# Patient Record
Sex: MALE | Race: WHITE | NOT HISPANIC OR LATINO | Employment: FULL TIME | URBAN - METROPOLITAN AREA
[De-identification: names, ages, dates, MRNs, and addresses within clinical notes are randomized per-mention and may not be internally consistent; named-entity substitution may affect disease eponyms.]

---
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
## 2020-06-30 ENCOUNTER — OFFICE VISIT (OUTPATIENT)
Dept: CARDIOLOGY | Facility: CLINIC | Age: 20
End: 2020-06-30

## 2020-06-30 VITALS
DIASTOLIC BLOOD PRESSURE: 78 MMHG | HEIGHT: 72 IN | WEIGHT: 165 LBS | SYSTOLIC BLOOD PRESSURE: 132 MMHG | HEART RATE: 64 BPM | BODY MASS INDEX: 22.35 KG/M2

## 2020-06-30 DIAGNOSIS — R01.1 MURMUR, CARDIAC: Primary | ICD-10-CM

## 2020-06-30 PROCEDURE — 93000 ELECTROCARDIOGRAM COMPLETE: CPT | Performed by: INTERNAL MEDICINE

## 2020-06-30 PROCEDURE — 99204 OFFICE O/P NEW MOD 45 MIN: CPT | Performed by: INTERNAL MEDICINE

## 2020-06-30 NOTE — PROGRESS NOTES
Date of Office Visit: 20  Encounter Provider: Margarito Cloud MD  Place of Service: [facility] CARDIOLOGY  Patient Name: Marcel Almodovar  :2000  [id number]    Chief Complaint   Patient presents with   • History of Heart Valve Disease   • Heart Murmur   :     HPI: Marcel Almodovar is a 20 y.o. male he is here as a new patient to follow-up a history of a heart murmur he had last seen a pediatric cardiologist 10 years ago and was told that he should see a heart doctor about every 5 years.  He plays full court basketball for up to an hour at a time without limitation he has no symptoms of chest pain shortness of breath palpitations syncope PND orthopnea edema him he is not limited in any way in his ability to exercise.  His mother who is a nurse and I know well says that it sounded like maybe he had a floppy valve and the murmur was from the chordae tendon a so it sounds like may be a mitral valve issue.  He otherwise does not have any other past medical history    History reviewed. No pertinent past medical history.    Past Surgical History:   Procedure Laterality Date   • TONSILLECTOMY     • WISDOM TOOTH EXTRACTION         Social History     Socioeconomic History   • Marital status: Single     Spouse name: Not on file   • Number of children: Not on file   • Years of education: Not on file   • Highest education level: Not on file   Tobacco Use   • Smoking status: Never Smoker   • Smokeless tobacco: Never Used   Substance and Sexual Activity   • Alcohol use: No   • Drug use: No       Family History   Problem Relation Age of Onset   • No Known Problems Mother    • No Known Problems Father    • Heart disease Maternal Grandmother    • Coronary artery disease Maternal Grandfather 60        CABG       Review of Systems   Constitution: Negative for decreased appetite, fever, malaise/fatigue and weight loss.   HENT: Negative for nosebleeds.    Eyes: Negative for double vision.  "  Cardiovascular: Negative for chest pain, claudication, cyanosis, dyspnea on exertion, irregular heartbeat, leg swelling, near-syncope, orthopnea, palpitations, paroxysmal nocturnal dyspnea and syncope.   Respiratory: Negative for cough, hemoptysis and shortness of breath.    Hematologic/Lymphatic: Negative for bleeding problem.   Skin: Negative for rash.   Musculoskeletal: Negative for falls and myalgias.   Gastrointestinal: Negative for hematochezia, jaundice, melena, nausea and vomiting.   Genitourinary: Negative for hematuria.   Neurological: Negative for dizziness and seizures.   Psychiatric/Behavioral: Negative for altered mental status and memory loss.       No Known Allergies    No current outpatient medications on file.      Objective:     Vitals:    06/30/20 1539   BP: 132/78   Pulse: 64   Weight: 74.8 kg (165 lb)   Height: 182.9 cm (72\")     Body mass index is 22.38 kg/m².    Physical Exam   Constitutional: He is oriented to person, place, and time. He appears well-developed and well-nourished.   HENT:   Head: Normocephalic.   Eyes: No scleral icterus.   Neck: No JVD present. No thyromegaly present.   Cardiovascular: Normal rate, regular rhythm and normal heart sounds. Exam reveals no gallop and no friction rub.   No murmur heard.  Pulmonary/Chest: Effort normal and breath sounds normal. He has no wheezes. He has no rales.   Abdominal: Soft. There is no hepatosplenomegaly. There is no tenderness.   Musculoskeletal: Normal range of motion. He exhibits no edema.   Lymphadenopathy:     He has no cervical adenopathy.   Neurological: He is alert and oriented to person, place, and time.   Skin: Skin is warm and dry. No rash noted.   Psychiatric: He has a normal mood and affect.         ECG 12 Lead  Date/Time: 6/30/2020 4:37 PM  Performed by: Margarito Cloud MD  Authorized by: Margarito Cloud MD   Comparison: not compared with previous ECG   Previous ECG: no previous ECG available  Rhythm: sinus " rhythm    Clinical impression: normal ECG             Assessment:       Diagnosis Plan   1. Murmur, cardiac            Plan:       On my exam today he is normal and his EKG is also normal I do not know if he had something and he outgrew it but I am not hearing much on his exam today what I am going to do is I am going asked that we get an echo because he had seen a cardiologist who felt like he needed to be seen periodically and if that looks good then I think we will just let him go and go from there if we see something that we need to do something about then we will have to continue to follow him to give him some general lifestyle modification recommendations and he is a really great kid    As always, it has been a pleasure to participate in your patient's care.      Sincerely,       Margarito Cloud MD

## 2021-02-16 ENCOUNTER — OFFICE VISIT (OUTPATIENT)
Dept: INTERNAL MEDICINE | Facility: CLINIC | Age: 21
End: 2021-02-16

## 2021-02-16 VITALS
TEMPERATURE: 97.3 F | WEIGHT: 168.2 LBS | OXYGEN SATURATION: 99 % | HEIGHT: 72 IN | DIASTOLIC BLOOD PRESSURE: 78 MMHG | BODY MASS INDEX: 22.78 KG/M2 | RESPIRATION RATE: 16 BRPM | SYSTOLIC BLOOD PRESSURE: 120 MMHG | HEART RATE: 100 BPM

## 2021-02-16 DIAGNOSIS — R59.0 POSTERIOR CERVICAL LYMPHADENOPATHY: ICD-10-CM

## 2021-02-16 DIAGNOSIS — R53.83 FATIGUE, UNSPECIFIED TYPE: ICD-10-CM

## 2021-02-16 DIAGNOSIS — J02.9 ACUTE PHARYNGITIS, UNSPECIFIED ETIOLOGY: Primary | ICD-10-CM

## 2021-02-16 PROCEDURE — 99203 OFFICE O/P NEW LOW 30 MIN: CPT | Performed by: NURSE PRACTITIONER

## 2021-02-16 NOTE — PROGRESS NOTES
Subjective    Marcel Almodovar is a 21 y.o. male presenting today for   Chief Complaint   Patient presents with   • Sore Throat     had covid in January- no fevers    • Fatigue   • Headache       Sore Throat   This is a new problem. Episode onset: 10 days ago. The problem has been unchanged. Neither side of throat is experiencing more pain than the other. Maximum temperature: Fever (uncertain to what degree) at onset of S&S but none in a week. Associated symptoms include congestion, coughing (productive) and headaches. Pertinent negatives include no diarrhea, shortness of breath or vomiting. Exposure to: No known ill contacts. He has tried acetaminophen (for HA) for the symptoms. The treatment provided significant relief.     Pt tested positive for COVID-19 on Jan 19th. Mild S&S. No returned to work on 1/28. His S&S resolved in that time w/ the exception of loss of taste and smell.     Marcel Almodovar presents today as a new patient to me to establish care.   Prior PCP was Dr. Pérez.  Patient Care Team:  Francy Montes De Oca APRN as PCP - General (Family Medicine)    Current/chronic health conditions include:    Patient Active Problem List   Diagnosis   • Murmur, cardiac       No current outpatient medications on file.        New complaints today include:  See above      The following portions of the patient's history were reviewed and updated as appropriate: allergies, current medications, problem list, past medical history, past surgical history, family history, and social history.     Review of Systems   Constitutional: Positive for fatigue and fever.   HENT: Positive for congestion, rhinorrhea and sore throat.    Respiratory: Positive for cough (productive). Negative for shortness of breath and wheezing.    Gastrointestinal: Negative for diarrhea, nausea and vomiting.   Neurological: Positive for headache.       Objective    Vitals:    02/16/21 1450   BP: 120/78   BP Location: Left arm   Patient Position:  "Sitting   Cuff Size: Adult   Pulse: 100   Resp: 16   Temp: 97.3 °F (36.3 °C)   TempSrc: Temporal   SpO2: 99%   Weight: 76.3 kg (168 lb 3.2 oz)   Height: 182.9 cm (72.01\")     Body mass index is 22.81 kg/m².  Nursing notes and vitals reviewed.    Physical Exam  Constitutional:       General: He is not in acute distress.     Appearance: He is well-developed.   HENT:      Right Ear: Tympanic membrane, ear canal and external ear normal.      Left Ear: Tympanic membrane, ear canal and external ear normal.      Nose: Nose normal.      Mouth/Throat:      Mouth: Mucous membranes are moist.      Pharynx: Oropharynx is clear. Uvula midline.   Eyes:      Conjunctiva/sclera: Conjunctivae normal.   Neck:      Musculoskeletal: Neck supple.      Thyroid: No thyroid mass or thyromegaly.   Cardiovascular:      Rate and Rhythm: Regular rhythm.      Pulses: Normal pulses.      Heart sounds: S1 normal and S2 normal. No murmur. No friction rub. No gallop.    Pulmonary:      Effort: Pulmonary effort is normal.      Breath sounds: Normal breath sounds. No wheezing, rhonchi or rales.   Abdominal:      General: Abdomen is flat.      Palpations: Abdomen is soft. There is no hepatomegaly or splenomegaly.      Tenderness: There is no abdominal tenderness.   Lymphadenopathy:      Cervical: Cervical adenopathy present.      Right cervical: Posterior cervical adenopathy present.      Left cervical: Posterior cervical adenopathy present.   Neurological:      Mental Status: He is alert and oriented to person, place, and time.   Psychiatric:         Attention and Perception: He is attentive.         Speech: Speech normal.         Behavior: Behavior normal.         Thought Content: Thought content normal.         No results found for this or any previous visit (from the past 672 hour(s)).      Assessment and Plan    Diagnoses and all orders for this visit:    1. Acute pharyngitis, unspecified etiology (Primary)  -     Throat / Upper Respiratory " Culture - Swab, Throat  -     CBC (No Diff)  -     Comprehensive Metabolic Panel  -     Mononucleosis Screen  -     EBV Antibody Profile    2. Fatigue, unspecified type  -     Throat / Upper Respiratory Culture - Swab, Throat  -     CBC (No Diff)  -     Comprehensive Metabolic Panel  -     Mononucleosis Screen  -     EBV Antibody Profile    3. Posterior cervical lymphadenopathy  -     Throat / Upper Respiratory Culture - Swab, Throat  -     CBC (No Diff)  -     Comprehensive Metabolic Panel  -     Mononucleosis Screen  -     EBV Antibody Profile      Rest.  Push fluids.  Cepacol OTC for ST.   Tylenol for HA.  Avoid contact sports. No kissing, sharing of food/beverages. Good hand washing.        Medications, including side effects, were discussed with the patient. Patient verbalized understanding.  The plan of care was discussed. All questions were answered. Patient verbalized understanding.        Return if symptoms worsen or fail to improve.

## 2021-02-19 LAB
ALBUMIN SERPL-MCNC: 4.7 G/DL (ref 3.5–5.2)
ALBUMIN/GLOB SERPL: 2 G/DL
ALP SERPL-CCNC: 91 U/L (ref 39–117)
ALT SERPL-CCNC: 15 U/L (ref 1–41)
AST SERPL-CCNC: 20 U/L (ref 1–40)
BACTERIA SPEC RESP CULT: NORMAL
BACTERIA SPEC RESP CULT: NORMAL
BILIRUB SERPL-MCNC: 0.3 MG/DL (ref 0–1.2)
BUN SERPL-MCNC: 10 MG/DL (ref 6–20)
BUN/CREAT SERPL: 10.3 (ref 7–25)
CALCIUM SERPL-MCNC: 9.8 MG/DL (ref 8.6–10.5)
CHLORIDE SERPL-SCNC: 101 MMOL/L (ref 98–107)
CO2 SERPL-SCNC: 28.1 MMOL/L (ref 22–29)
CREAT SERPL-MCNC: 0.97 MG/DL (ref 0.76–1.27)
EBV NA IGG SER IA-ACNC: 527 U/ML (ref 0–17.9)
EBV VCA IGG SER IA-ACNC: >600 U/ML (ref 0–17.9)
EBV VCA IGM SER IA-ACNC: <36 U/ML (ref 0–35.9)
ERYTHROCYTE [DISTWIDTH] IN BLOOD BY AUTOMATED COUNT: 12.5 % (ref 12.3–15.4)
GLOBULIN SER CALC-MCNC: 2.4 GM/DL
GLUCOSE SERPL-MCNC: 79 MG/DL (ref 65–99)
HCT VFR BLD AUTO: 46.9 % (ref 37.5–51)
HETEROPH AB SER QL LA: NEGATIVE
HGB BLD-MCNC: 15.7 G/DL (ref 13–17.7)
MCH RBC QN AUTO: 28.5 PG (ref 26.6–33)
MCHC RBC AUTO-ENTMCNC: 33.5 G/DL (ref 31.5–35.7)
MCV RBC AUTO: 85.3 FL (ref 79–97)
PLATELET # BLD AUTO: 216 10*3/MM3 (ref 140–450)
POTASSIUM SERPL-SCNC: 4.3 MMOL/L (ref 3.5–5.2)
PROT SERPL-MCNC: 7.1 G/DL (ref 6–8.5)
RBC # BLD AUTO: 5.5 10*6/MM3 (ref 4.14–5.8)
SERVICE CMNT-IMP: ABNORMAL
SODIUM SERPL-SCNC: 141 MMOL/L (ref 136–145)
WBC # BLD AUTO: 7.53 10*3/MM3 (ref 3.4–10.8)

## 2024-02-29 ENCOUNTER — OFFICE VISIT (OUTPATIENT)
Dept: ORTHOPEDIC SURGERY | Facility: CLINIC | Age: 24
End: 2024-02-29
Payer: COMMERCIAL

## 2024-02-29 VITALS — WEIGHT: 180.3 LBS | TEMPERATURE: 98.6 F | HEIGHT: 72 IN | BODY MASS INDEX: 24.42 KG/M2

## 2024-02-29 DIAGNOSIS — M77.12 LATERAL EPICONDYLITIS OF LEFT ELBOW: ICD-10-CM

## 2024-02-29 DIAGNOSIS — M79.632 LEFT FOREARM PAIN: Primary | ICD-10-CM

## 2024-02-29 PROCEDURE — 99203 OFFICE O/P NEW LOW 30 MIN: CPT | Performed by: ORTHOPAEDIC SURGERY

## 2024-02-29 RX ORDER — MELOXICAM 15 MG/1
TABLET ORAL
Qty: 30 TABLET | Refills: 0 | Status: SHIPPED | OUTPATIENT
Start: 2024-02-29

## 2024-02-29 NOTE — PROGRESS NOTES
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        Allergies: No Known Allergies    Medications:   Home Medications:  No current outpatient medications on file prior to visit.     No current facility-administered medications on file prior to visit.     Current Medications:  Scheduled Meds:  Continuous Infusions:No current facility-administered medications for this visit.    PRN Meds:.    History reviewed. No pertinent past medical history.     Past Surgical History:   Procedure Laterality Date    TONSILLECTOMY  2017        Social History     Occupational History    Not on file   Tobacco Use    Smoking status: Never    Smokeless tobacco: Never    Tobacco comments:     Pt vapes   Vaping Use    Vaping Use: Every day    Substances: Nicotine    Devices: Refillable tank   Substance and Sexual Activity    Alcohol use: No    Drug use: Yes     Types: Marijuana     Comment: currently smokes maijuana daily     Sexual activity: Not on file      Social History     Social History Narrative    Not on file        Family History   Problem Relation Age of Onset    No Known Problems Mother     Prostate cancer Father     Heart disease Maternal Grandmother     Coronary artery disease Maternal Grandfather 60        CABG             Review of Systems:   Review of Systems      Physical Exam: 24 y.o. male  General Appearance:    Alert, cooperative, in no acute distress                   Vitals:    02/29/24 1120   Temp: 98.6 °F (37 °C)   TempSrc: Temporal   Weight: 81.8 kg (180 lb 4.8 oz)   Height: 182.9 cm (72\")   PainSc:   6      Patient is " alert and read ×3 no acute distress appears her above-listed at height weight and age.  Affect is normal respiratory rate is normal unlabored. Heart rate regular rate rhythm, sclera, dentition and hearing are normal for the purpose of this exam.        Ortho Exam        Physical exam of the left elbow reveals no effusion no redness.  The patient has full range of motion.  They have tenderness over the lateral condyle.  There pain with resisted wrist extension no pain with passive wrist flexion.  They have a negative Tinel's.  Have no overlying skin changes no lymphedema no lymphadenopathy.  Good distal pulses       Radiology:   AP, Lateral of the left elbow were ordered/reviewed to evaluate pain.  I have no comparative films of this area are normal      Assessment/Plan:    Left elbow pain I think this is lateral condyle lightest which I discussed with him in detail he just does a lot with his upper extremities I am going to get him to give him a tennis elbow strap having show how to stretch this I will give him some Rx alternative cream with strict precautions and have him start on meloxicam again with strict precautions I will see him back 4 weeks if he still symptomatic would consider an injection